# Patient Record
Sex: FEMALE | Race: WHITE | NOT HISPANIC OR LATINO
[De-identification: names, ages, dates, MRNs, and addresses within clinical notes are randomized per-mention and may not be internally consistent; named-entity substitution may affect disease eponyms.]

---

## 2017-08-21 PROBLEM — Z00.00 ENCOUNTER FOR PREVENTIVE HEALTH EXAMINATION: Status: ACTIVE | Noted: 2017-08-21

## 2017-08-25 ENCOUNTER — APPOINTMENT (OUTPATIENT)
Dept: OTOLARYNGOLOGY | Facility: CLINIC | Age: 67
End: 2017-08-25
Payer: MEDICARE

## 2017-08-25 VITALS — BODY MASS INDEX: 24.32 KG/M2 | HEIGHT: 65 IN | WEIGHT: 146 LBS

## 2017-08-25 VITALS
DIASTOLIC BLOOD PRESSURE: 95 MMHG | SYSTOLIC BLOOD PRESSURE: 130 MMHG | HEART RATE: 71 BPM | TEMPERATURE: 98.1 F | OXYGEN SATURATION: 98 %

## 2017-08-25 DIAGNOSIS — Z86.39 PERSONAL HISTORY OF OTHER ENDOCRINE, NUTRITIONAL AND METABOLIC DISEASE: ICD-10-CM

## 2017-08-25 DIAGNOSIS — Z80.9 FAMILY HISTORY OF MALIGNANT NEOPLASM, UNSPECIFIED: ICD-10-CM

## 2017-08-25 DIAGNOSIS — Z78.9 OTHER SPECIFIED HEALTH STATUS: ICD-10-CM

## 2017-08-25 DIAGNOSIS — Z86.79 PERSONAL HISTORY OF OTHER DISEASES OF THE CIRCULATORY SYSTEM: ICD-10-CM

## 2017-08-25 DIAGNOSIS — Z82.49 FAMILY HISTORY OF ISCHEMIC HEART DISEASE AND OTHER DISEASES OF THE CIRCULATORY SYSTEM: ICD-10-CM

## 2017-08-25 DIAGNOSIS — Z82.2 FAMILY HISTORY OF DEAFNESS AND HEARING LOSS: ICD-10-CM

## 2017-08-25 DIAGNOSIS — Z01.10 ENCOUNTER FOR EXAMINATION OF EARS AND HEARING W/OUT ABNORMAL FINDINGS: ICD-10-CM

## 2017-08-25 PROCEDURE — 92557 COMPREHENSIVE HEARING TEST: CPT

## 2017-08-25 PROCEDURE — 92550 TYMPANOMETRY & REFLEX THRESH: CPT

## 2017-08-25 PROCEDURE — 99203 OFFICE O/P NEW LOW 30 MIN: CPT

## 2017-08-25 RX ORDER — METOPROLOL SUCCINATE 50 MG/1
50 TABLET, EXTENDED RELEASE ORAL
Qty: 30 | Refills: 0 | Status: ACTIVE | COMMUNITY
Start: 2017-03-28

## 2017-08-25 RX ORDER — LATANOPROST 50 UG/ML
0.01 SOLUTION OPHTHALMIC
Qty: 2 | Refills: 0 | Status: ACTIVE | COMMUNITY
Start: 2017-03-01

## 2017-08-25 RX ORDER — LEVOTHYROXINE SODIUM 0.1 MG/1
100 TABLET ORAL
Qty: 30 | Refills: 0 | Status: ACTIVE | COMMUNITY
Start: 2017-03-12

## 2017-08-25 RX ORDER — FLUOROURACIL 50 MG/G
5 CREAM TOPICAL
Qty: 40 | Refills: 0 | Status: ACTIVE | COMMUNITY
Start: 2017-06-14

## 2017-08-25 RX ORDER — ATORVASTATIN CALCIUM 20 MG/1
20 TABLET, FILM COATED ORAL
Qty: 30 | Refills: 0 | Status: ACTIVE | COMMUNITY
Start: 2017-04-03

## 2017-09-07 ENCOUNTER — APPOINTMENT (OUTPATIENT)
Dept: OTOLARYNGOLOGY | Facility: CLINIC | Age: 67
End: 2017-09-07
Payer: MEDICARE

## 2017-09-07 ENCOUNTER — APPOINTMENT (OUTPATIENT)
Dept: PHARMACY | Facility: CLINIC | Age: 67
End: 2017-09-07
Payer: MEDICARE

## 2017-09-07 ENCOUNTER — APPOINTMENT (OUTPATIENT)
Dept: PHARMACY | Facility: CLINIC | Age: 67
End: 2017-09-07
Payer: SELF-PAY

## 2017-09-07 ENCOUNTER — APPOINTMENT (OUTPATIENT)
Dept: OTOLARYNGOLOGY | Facility: CLINIC | Age: 67
End: 2017-09-07

## 2017-09-07 DIAGNOSIS — H90.41 SENSORINEURAL HEARING LOSS, UNILATERAL, RIGHT EAR, WITH UNRESTRICTED HEARING ON THE CONTRALATERAL SIDE: ICD-10-CM

## 2017-09-07 DIAGNOSIS — R42 DIZZINESS AND GIDDINESS: ICD-10-CM

## 2017-09-07 PROCEDURE — 92537 CALORIC VSTBLR TEST W/REC: CPT

## 2017-09-07 PROCEDURE — 99214 OFFICE O/P EST MOD 30 MIN: CPT

## 2017-09-07 PROCEDURE — V5010 ASSESSMENT FOR HEARING AID: CPT

## 2017-09-07 PROCEDURE — 92540 BASIC VESTIBULAR EVALUATION: CPT

## 2017-09-12 ENCOUNTER — APPOINTMENT (OUTPATIENT)
Dept: PHARMACY | Facility: CLINIC | Age: 67
End: 2017-09-12
Payer: SELF-PAY

## 2017-09-12 PROCEDURE — V5261D: CUSTOM

## 2017-09-26 ENCOUNTER — APPOINTMENT (OUTPATIENT)
Dept: PHARMACY | Facility: CLINIC | Age: 67
End: 2017-09-26
Payer: SELF-PAY

## 2017-09-26 PROCEDURE — V5299A: CUSTOM | Mod: NC

## 2017-09-29 ENCOUNTER — APPOINTMENT (OUTPATIENT)
Dept: PHARMACY | Facility: CLINIC | Age: 67
End: 2017-09-29
Payer: SELF-PAY

## 2017-09-29 PROCEDURE — V5299A: CUSTOM | Mod: NC

## 2017-10-04 ENCOUNTER — APPOINTMENT (OUTPATIENT)
Dept: OTOLARYNGOLOGY | Facility: CLINIC | Age: 67
End: 2017-10-04

## 2018-03-13 ENCOUNTER — APPOINTMENT (OUTPATIENT)
Dept: PHARMACY | Facility: CLINIC | Age: 68
End: 2018-03-13
Payer: SELF-PAY

## 2018-03-13 PROCEDURE — V5299A: CUSTOM | Mod: NC

## 2018-03-15 ENCOUNTER — APPOINTMENT (OUTPATIENT)
Dept: PHARMACY | Facility: CLINIC | Age: 68
End: 2018-03-15
Payer: SELF-PAY

## 2018-03-15 PROCEDURE — V5299A: CUSTOM | Mod: NC

## 2018-06-25 ENCOUNTER — APPOINTMENT (OUTPATIENT)
Dept: PHARMACY | Facility: CLINIC | Age: 68
End: 2018-06-25
Payer: SELF-PAY

## 2018-06-25 PROCEDURE — V5299A: CUSTOM | Mod: NC

## 2018-12-19 ENCOUNTER — APPOINTMENT (OUTPATIENT)
Dept: OTOLARYNGOLOGY | Facility: CLINIC | Age: 68
End: 2018-12-19
Payer: MEDICARE

## 2018-12-19 ENCOUNTER — APPOINTMENT (OUTPATIENT)
Dept: PHARMACY | Facility: CLINIC | Age: 68
End: 2018-12-19
Payer: MEDICARE

## 2018-12-19 PROCEDURE — 92550 TYMPANOMETRY & REFLEX THRESH: CPT

## 2018-12-19 PROCEDURE — 92557 COMPREHENSIVE HEARING TEST: CPT

## 2018-12-19 PROCEDURE — V5299A: CUSTOM | Mod: NC

## 2019-06-17 ENCOUNTER — APPOINTMENT (OUTPATIENT)
Dept: PHARMACY | Facility: CLINIC | Age: 69
End: 2019-06-17
Payer: SELF-PAY

## 2019-06-17 PROCEDURE — V5299A: CUSTOM | Mod: NC

## 2019-09-10 ENCOUNTER — OTHER (OUTPATIENT)
Age: 69
End: 2019-09-10

## 2019-10-04 ENCOUNTER — APPOINTMENT (OUTPATIENT)
Dept: PHARMACY | Facility: CLINIC | Age: 69
End: 2019-10-04
Payer: SELF-PAY

## 2019-10-04 PROCEDURE — V5299A: CUSTOM | Mod: NC

## 2019-12-20 ENCOUNTER — APPOINTMENT (OUTPATIENT)
Dept: PHARMACY | Facility: CLINIC | Age: 69
End: 2019-12-20
Payer: SELF-PAY

## 2019-12-20 PROCEDURE — V5299A: CUSTOM | Mod: NC

## 2020-06-19 ENCOUNTER — APPOINTMENT (OUTPATIENT)
Dept: PHARMACY | Facility: CLINIC | Age: 70
End: 2020-06-19
Payer: MEDICARE

## 2020-06-19 ENCOUNTER — APPOINTMENT (OUTPATIENT)
Dept: OTOLARYNGOLOGY | Facility: CLINIC | Age: 70
End: 2020-06-19
Payer: MEDICARE

## 2020-06-19 PROCEDURE — 92550 TYMPANOMETRY & REFLEX THRESH: CPT

## 2020-06-19 PROCEDURE — V5299A: CUSTOM | Mod: NC

## 2020-06-19 PROCEDURE — 92557 COMPREHENSIVE HEARING TEST: CPT

## 2020-12-18 ENCOUNTER — APPOINTMENT (OUTPATIENT)
Dept: PHARMACY | Facility: CLINIC | Age: 70
End: 2020-12-18

## 2022-05-06 ENCOUNTER — APPOINTMENT (OUTPATIENT)
Dept: PHARMACY | Facility: CLINIC | Age: 72
End: 2022-05-06
Payer: SELF-PAY

## 2022-05-06 PROCEDURE — V5299A: CUSTOM | Mod: NC

## 2022-05-18 ENCOUNTER — APPOINTMENT (OUTPATIENT)
Dept: PHARMACY | Facility: CLINIC | Age: 72
End: 2022-05-18
Payer: COMMERCIAL

## 2022-05-18 ENCOUNTER — APPOINTMENT (OUTPATIENT)
Dept: OTOLARYNGOLOGY | Facility: CLINIC | Age: 72
End: 2022-05-18
Payer: MEDICARE

## 2022-05-18 ENCOUNTER — APPOINTMENT (OUTPATIENT)
Dept: PHARMACY | Facility: CLINIC | Age: 72
End: 2022-05-18
Payer: MEDICARE

## 2022-05-18 VITALS
WEIGHT: 150 LBS | DIASTOLIC BLOOD PRESSURE: 76 MMHG | OXYGEN SATURATION: 96 % | RESPIRATION RATE: 18 BRPM | BODY MASS INDEX: 25.61 KG/M2 | TEMPERATURE: 97.6 F | SYSTOLIC BLOOD PRESSURE: 145 MMHG | HEIGHT: 64 IN | HEART RATE: 75 BPM

## 2022-05-18 PROCEDURE — 92557 COMPREHENSIVE HEARING TEST: CPT

## 2022-05-18 PROCEDURE — 92550 TYMPANOMETRY & REFLEX THRESH: CPT

## 2022-05-18 PROCEDURE — 99213 OFFICE O/P EST LOW 20 MIN: CPT | Mod: 25

## 2022-05-18 PROCEDURE — V5010 ASSESSMENT FOR HEARING AID: CPT

## 2022-05-18 PROCEDURE — 69210 REMOVE IMPACTED EAR WAX UNI: CPT

## 2022-05-18 NOTE — HISTORY OF PRESENT ILLNESS
[de-identified] : 71 y/o F last seen in 2017 presenting with b aural fullness. She wears b HA. She has an appointment with audiology for  and would like to have her ears checked first. She feels they have been clogged for the past week. She denies otalgia or drainage. She has known hl and feels R side is worse than L. Nonsmoker. No FH or SH pertinent to cc. Denies fevers, chills, sweats.

## 2022-05-18 NOTE — PHYSICAL EXAM
[Normal] : no rashes [de-identified] : b copious cerumen removed atraumatically with suction and hook, TMs intact  [de-identified] : gait steady

## 2022-05-18 NOTE — ASSESSMENT
[FreeTextEntry1] : 1. b snhl\par - showed b snhl- minimal decrease from 2020- results reviewed with pt  \par -brought to audiology to adjust hearing aids\par 2. b cerumen impaction \par -cerumen removed\par -ears feel better\par -Avoid Qtip usage\par RTC in 1 yr to recheck hearing or sooner as needed\par

## 2022-06-01 ENCOUNTER — TRANSCRIPTION ENCOUNTER (OUTPATIENT)
Age: 72
End: 2022-06-01

## 2022-06-08 ENCOUNTER — APPOINTMENT (OUTPATIENT)
Dept: PHARMACY | Facility: CLINIC | Age: 72
End: 2022-06-08
Payer: SELF-PAY

## 2022-06-08 PROCEDURE — V5261D: CUSTOM

## 2022-06-29 ENCOUNTER — APPOINTMENT (OUTPATIENT)
Dept: PHARMACY | Facility: CLINIC | Age: 72
End: 2022-06-29

## 2022-06-29 PROCEDURE — V5299A: CUSTOM | Mod: NC

## 2022-10-25 ENCOUNTER — APPOINTMENT (OUTPATIENT)
Dept: PHARMACY | Facility: CLINIC | Age: 72
End: 2022-10-25

## 2022-10-25 PROCEDURE — V5299A: CUSTOM | Mod: NC

## 2023-03-29 ENCOUNTER — APPOINTMENT (OUTPATIENT)
Dept: PHARMACY | Facility: CLINIC | Age: 73
End: 2023-03-29
Payer: SELF-PAY

## 2023-03-29 PROCEDURE — V5299A: CUSTOM | Mod: NC

## 2023-05-18 ENCOUNTER — APPOINTMENT (OUTPATIENT)
Dept: OTOLARYNGOLOGY | Facility: CLINIC | Age: 73
End: 2023-05-18
Payer: MEDICARE

## 2023-05-18 ENCOUNTER — APPOINTMENT (OUTPATIENT)
Dept: PHARMACY | Facility: CLINIC | Age: 73
End: 2023-05-18
Payer: SELF-PAY

## 2023-05-18 ENCOUNTER — APPOINTMENT (OUTPATIENT)
Dept: PHARMACY | Facility: CLINIC | Age: 73
End: 2023-05-18
Payer: MEDICARE

## 2023-05-18 VITALS
OXYGEN SATURATION: 98 % | BODY MASS INDEX: 24.75 KG/M2 | HEIGHT: 64 IN | TEMPERATURE: 98 F | SYSTOLIC BLOOD PRESSURE: 105 MMHG | DIASTOLIC BLOOD PRESSURE: 67 MMHG | RESPIRATION RATE: 16 BRPM | HEART RATE: 73 BPM | WEIGHT: 145 LBS

## 2023-05-18 PROCEDURE — 99213 OFFICE O/P EST LOW 20 MIN: CPT

## 2023-05-18 PROCEDURE — 92550 TYMPANOMETRY & REFLEX THRESH: CPT | Mod: 52

## 2023-05-18 PROCEDURE — V5299A: CUSTOM | Mod: NC

## 2023-05-18 PROCEDURE — 92557 COMPREHENSIVE HEARING TEST: CPT

## 2023-05-18 NOTE — DATA REVIEWED
[de-identified] :  showed b downsloping snhl - stable when compared with 22, type a tympanograms -results reviewed with pt

## 2023-05-18 NOTE — ASSESSMENT
[FreeTextEntry1] : b hf snhl\par - showed b snhl - stable when compared with 22 -results reviewed with pt \par -continue with hearing aids as is\par RTC in 1 yr to recheck hearing; call sooner for any issues

## 2023-05-18 NOTE — HISTORY OF PRESENT ILLNESS
[de-identified] : 1 year followup visit for this 72 y/o F with h/o b snhl and cerumen impaction. She wears b hearing aids. She is here for annual followup to recheck her hearing. She is not sure if she notices any changes in her hearing.

## 2023-11-21 ENCOUNTER — APPOINTMENT (OUTPATIENT)
Dept: PHARMACY | Facility: CLINIC | Age: 73
End: 2023-11-21
Payer: SELF-PAY

## 2023-11-21 PROCEDURE — V5299A: CUSTOM

## 2024-05-22 ENCOUNTER — APPOINTMENT (OUTPATIENT)
Dept: OTOLARYNGOLOGY | Facility: CLINIC | Age: 74
End: 2024-05-22
Payer: MEDICARE

## 2024-05-22 ENCOUNTER — APPOINTMENT (OUTPATIENT)
Dept: PHARMACY | Facility: CLINIC | Age: 74
End: 2024-05-22
Payer: SELF-PAY

## 2024-05-22 VITALS
HEIGHT: 64 IN | SYSTOLIC BLOOD PRESSURE: 120 MMHG | BODY MASS INDEX: 26.12 KG/M2 | OXYGEN SATURATION: 98 % | WEIGHT: 153 LBS | HEART RATE: 67 BPM | DIASTOLIC BLOOD PRESSURE: 71 MMHG | TEMPERATURE: 97 F

## 2024-05-22 DIAGNOSIS — H61.23 IMPACTED CERUMEN, BILATERAL: ICD-10-CM

## 2024-05-22 DIAGNOSIS — H90.3 SENSORINEURAL HEARING LOSS, BILATERAL: ICD-10-CM

## 2024-05-22 PROCEDURE — 99213 OFFICE O/P EST LOW 20 MIN: CPT | Mod: 25

## 2024-05-22 PROCEDURE — V5299A: CUSTOM

## 2024-05-22 PROCEDURE — 92567 TYMPANOMETRY: CPT

## 2024-05-22 PROCEDURE — 92557 COMPREHENSIVE HEARING TEST: CPT

## 2024-05-22 PROCEDURE — G0268 REMOVAL OF IMPACTED WAX MD: CPT

## 2024-05-22 NOTE — HISTORY OF PRESENT ILLNESS
[de-identified] : 1 year follow up visit for this 73 y/o F with h/o b snhl and cerumen impaction. She wears b hearing aids and is here to recheck her hearing. No new ear, nose, or throat issues otherwise.

## 2024-05-22 NOTE — DATA REVIEWED
[de-identified] :  showed b snhl, b nl tympanograms- minimally decreased when compared with 23- results reviewed with pt

## 2024-05-22 NOTE — PHYSICAL EXAM
[de-identified] : b copious cerumen removed atraumatically with curette r>l, b tms nl [Normal] : no nystagmus [de-identified] : gait steady

## 2024-05-22 NOTE — PROCEDURE
[Cerumen Impaction] : Cerumen Impaction [Same] : same as the Pre Op Dx. [] : Removal of Cerumen [FreeTextEntry5] : b copious cerumen removed atraumatically with curette r>l, b tms nl

## 2024-05-22 NOTE — ASSESSMENT
[FreeTextEntry1] : 1. b cerumen impaction -cerumen removed -ears felt better 2. b snhl - showed b snhl, b nl tympanograms- minimally decreased when compared with 23- results reviewed with pt -continue with hearing aids RTC in 1 yr with rpt ; call sooner for any issues

## 2024-07-09 ENCOUNTER — APPOINTMENT (OUTPATIENT)
Dept: OTOLARYNGOLOGY | Facility: CLINIC | Age: 74
End: 2024-07-09
Payer: MEDICARE

## 2024-07-09 VITALS — HEIGHT: 65 IN | WEIGHT: 153 LBS | BODY MASS INDEX: 25.49 KG/M2

## 2024-07-09 DIAGNOSIS — H92.01 OTALGIA, RIGHT EAR: ICD-10-CM

## 2024-07-09 PROCEDURE — 99212 OFFICE O/P EST SF 10 MIN: CPT

## 2025-05-20 ENCOUNTER — NON-APPOINTMENT (OUTPATIENT)
Age: 75
End: 2025-05-20

## 2025-05-21 ENCOUNTER — APPOINTMENT (OUTPATIENT)
Dept: OTOLARYNGOLOGY | Facility: CLINIC | Age: 75
End: 2025-05-21
Payer: MEDICARE

## 2025-05-21 ENCOUNTER — NON-APPOINTMENT (OUTPATIENT)
Age: 75
End: 2025-05-21

## 2025-05-21 ENCOUNTER — APPOINTMENT (OUTPATIENT)
Dept: PHARMACY | Facility: CLINIC | Age: 75
End: 2025-05-21
Payer: SELF-PAY

## 2025-05-21 VITALS
HEART RATE: 65 BPM | OXYGEN SATURATION: 98 % | BODY MASS INDEX: 25.49 KG/M2 | DIASTOLIC BLOOD PRESSURE: 74 MMHG | WEIGHT: 153 LBS | HEIGHT: 65 IN | TEMPERATURE: 98 F | SYSTOLIC BLOOD PRESSURE: 127 MMHG

## 2025-05-21 DIAGNOSIS — H61.23 IMPACTED CERUMEN, BILATERAL: ICD-10-CM

## 2025-05-21 DIAGNOSIS — H90.3 SENSORINEURAL HEARING LOSS, BILATERAL: ICD-10-CM

## 2025-05-21 PROCEDURE — G0268 REMOVAL OF IMPACTED WAX MD: CPT

## 2025-05-21 PROCEDURE — 92557 COMPREHENSIVE HEARING TEST: CPT

## 2025-05-21 PROCEDURE — V5299A: CUSTOM

## 2025-05-21 PROCEDURE — 99213 OFFICE O/P EST LOW 20 MIN: CPT | Mod: 25

## 2025-05-21 PROCEDURE — 92567 TYMPANOMETRY: CPT

## 2025-07-07 ENCOUNTER — APPOINTMENT (OUTPATIENT)
Dept: PHARMACY | Facility: CLINIC | Age: 75
End: 2025-07-07
Payer: SELF-PAY

## 2025-07-07 PROCEDURE — 92593: CPT | Mod: NC

## 2025-08-12 ENCOUNTER — APPOINTMENT (OUTPATIENT)
Dept: ORTHOPEDIC SURGERY | Facility: CLINIC | Age: 75
End: 2025-08-12
Payer: MEDICARE

## 2025-08-12 VITALS — WEIGHT: 153 LBS | HEIGHT: 64.5 IN | BODY MASS INDEX: 25.8 KG/M2

## 2025-08-12 DIAGNOSIS — M23.92 UNSPECIFIED INTERNAL DERANGEMENT OF LEFT KNEE: ICD-10-CM

## 2025-08-12 DIAGNOSIS — M25.562 PAIN IN LEFT KNEE: ICD-10-CM

## 2025-08-12 DIAGNOSIS — M23.301 OTHER MENISCUS DERANGEMENTS, UNSPECIFIED LATERAL MENISCUS, LEFT KNEE: ICD-10-CM

## 2025-08-12 DIAGNOSIS — Z86.79 PERSONAL HISTORY OF OTHER DISEASES OF THE CIRCULATORY SYSTEM: ICD-10-CM

## 2025-08-12 DIAGNOSIS — Z60.2 PROBLEMS RELATED TO LIVING ALONE: ICD-10-CM

## 2025-08-12 PROCEDURE — 99203 OFFICE O/P NEW LOW 30 MIN: CPT

## 2025-08-12 PROCEDURE — 73564 X-RAY EXAM KNEE 4 OR MORE: CPT | Mod: LT

## 2025-08-12 SDOH — SOCIAL STABILITY - SOCIAL INSECURITY: PROBLEMS RELATED TO LIVING ALONE: Z60.2

## 2025-08-15 ENCOUNTER — NON-APPOINTMENT (OUTPATIENT)
Age: 75
End: 2025-08-15

## 2025-08-26 ENCOUNTER — APPOINTMENT (OUTPATIENT)
Dept: ORTHOPEDIC SURGERY | Facility: CLINIC | Age: 75
End: 2025-08-26
Payer: MEDICARE

## 2025-08-26 DIAGNOSIS — M25.562 PAIN IN LEFT KNEE: ICD-10-CM

## 2025-08-26 DIAGNOSIS — M67.911 UNSPECIFIED DISORDER OF SYNOVIUM AND TENDON, RIGHT SHOULDER: ICD-10-CM

## 2025-08-26 DIAGNOSIS — M23.92 UNSPECIFIED INTERNAL DERANGEMENT OF LEFT KNEE: ICD-10-CM

## 2025-08-26 DIAGNOSIS — M17.12 UNILATERAL PRIMARY OSTEOARTHRITIS, LEFT KNEE: ICD-10-CM

## 2025-08-26 DIAGNOSIS — M23.301 OTHER MENISCUS DERANGEMENTS, UNSPECIFIED LATERAL MENISCUS, LEFT KNEE: ICD-10-CM

## 2025-08-26 PROCEDURE — 99214 OFFICE O/P EST MOD 30 MIN: CPT
